# Patient Record
Sex: FEMALE | Race: WHITE
[De-identification: names, ages, dates, MRNs, and addresses within clinical notes are randomized per-mention and may not be internally consistent; named-entity substitution may affect disease eponyms.]

---

## 2017-01-13 DIAGNOSIS — R80.9 TYPE 2 DIABETES MELLITUS WITH MICROALBUMINURIA, WITHOUT LONG-TERM CURRENT USE OF INSULIN (H): Primary | ICD-10-CM

## 2017-01-13 DIAGNOSIS — E11.29 TYPE 2 DIABETES MELLITUS WITH MICROALBUMINURIA, WITHOUT LONG-TERM CURRENT USE OF INSULIN (H): Primary | ICD-10-CM

## 2017-01-13 NOTE — TELEPHONE ENCOUNTER
metFORMIN modified (GLUMETZA) 1000 MG 24 hr tablet         Last Written Prescription Date: 10/26/16  Last Fill Quantity: 180, # refills: 0  Last Office Visit with G, P or [facility] prescribing provider:  3/15/16        BP Readings from Last 3 Encounters:   04/05/16 135/87   03/15/16 114/74   10/01/15 119/74     MICROL       51   4/15/2016  No results found for this basename: microalbumin  CREATININE   Date Value Ref Range Status   04/15/2016 0.65 0.52 - 1.04 mg/dL Final   ]  GFR ESTIMATE   Date Value Ref Range Status   04/15/2016 >90  Non  GFR Calc   >60 mL/min/1.7m2 Final   03/01/2016 80 >60 mL/min/1.7m2 Final     Comment:     Non  GFR Calc   09/25/2015 69 >60 mL/min/1.7m2 Final     Comment:     Non  GFR Calc     GFR ESTIMATE IF BLACK   Date Value Ref Range Status   04/15/2016 >90   GFR Calc   >60 mL/min/1.7m2 Final   03/01/2016 >90   GFR Calc   >60 mL/min/1.7m2 Final   09/25/2015 83 >60 mL/min/1.7m2 Final     Comment:      GFR Calc     CHOL      167   4/15/2016  HDL       47   4/15/2016  LDL       88   4/15/2016  TRIG      158   4/15/2016  CHOLHDLRATIO      3.9   9/25/2015  AST        8   4/15/2016  ALT       16   4/15/2016  A1C      6.9   4/15/2016  A1C      6.8   3/1/2016  A1C      6.5   9/25/2015  A1C      7.0   12/16/2014  A1C      6.7   8/18/2014  POTASSIUM   Date Value Ref Range Status   04/15/2016 4.7 3.4 - 5.3 mmol/L Final

## 2017-01-16 RX ORDER — METFORMIN HYDROCHLORIDE 1000 MG/1
2000 TABLET, FILM COATED, EXTENDED RELEASE ORAL
Qty: 180 TABLET | Refills: 0 | OUTPATIENT
Start: 2017-01-16

## 2017-01-16 NOTE — TELEPHONE ENCOUNTER
See previous requests, she was establishing care with a new provider near her home in December.     Rx refused, called pharmacy and informed them. They will send to her new provider.     Anisha Paul RN   January 16, 2017 10:14 AM  [facility] Triage   [phone]

## 2017-05-15 DIAGNOSIS — E78.5 HYPERLIPIDEMIA LDL GOAL <100: ICD-10-CM

## 2017-05-15 DIAGNOSIS — E11.29 TYPE 2 DIABETES MELLITUS WITH MICROALBUMINURIA, WITHOUT LONG-TERM CURRENT USE OF INSULIN (H): Primary | ICD-10-CM

## 2017-05-15 DIAGNOSIS — R80.9 TYPE 2 DIABETES MELLITUS WITH MICROALBUMINURIA, WITHOUT LONG-TERM CURRENT USE OF INSULIN (H): Primary | ICD-10-CM

## 2017-05-15 RX ORDER — LISINOPRIL 5 MG/1
5 TABLET ORAL DAILY
Qty: 90 TABLET | Refills: 1 | Status: SHIPPED | OUTPATIENT
Start: 2017-05-15 | End: 2017-10-29

## 2017-05-15 RX ORDER — SIMVASTATIN 40 MG
40 TABLET ORAL DAILY
Qty: 90 TABLET | Refills: 1 | Status: SHIPPED | OUTPATIENT
Start: 2017-05-15 | End: 2018-01-27

## 2017-05-15 NOTE — TELEPHONE ENCOUNTER
Routing refill request to provider for review/approval because:  Labs not current  Ronda Mchugh RN [facility] Triage.

## 2017-05-15 NOTE — TELEPHONE ENCOUNTER
simvastatin (ZOCOR) 40 MG tablet     Last Written Prescription Date: 10-25-16  Last Fill Quantity: 90, # refills: 1  Last Office Visit with [facility], [facility] or [facility] prescribing provider: 3-15-16       Lab Results   Component Value Date    CHOL 167 04/15/2016     Lab Results   Component Value Date    HDL 47 04/15/2016     Lab Results   Component Value Date    LDL 88 04/15/2016     Lab Results   Component Value Date    TRIG 158 04/15/2016     Lab Results   Component Value Date    CHOLHDLRATIO 3.9 09/25/2015     lisinopril (PRINIVIL,ZESTRIL) 5 MG tablet      Last Written Prescription Date: 10-25-16  Last Fill Quantity: 90, # refills: 1  Last Office Visit with [facility], [facility] or [facility] prescribing provider: 3-15-16       Potassium   Date Value Ref Range Status   04/15/2016 4.7 3.4 - 5.3 mmol/L Final     Creatinine   Date Value Ref Range Status   04/15/2016 0.65 0.52 - 1.04 mg/dL Final     BP Readings from Last 3 Encounters:   04/05/16 135/87   03/15/16 114/74   10/01/15 119/74

## 2017-08-12 ENCOUNTER — HEALTH MAINTENANCE LETTER (OUTPATIENT)
Age: 62
End: 2017-08-12

## 2017-10-29 DIAGNOSIS — Z11.59 NEED FOR HEPATITIS C SCREENING TEST: ICD-10-CM

## 2017-10-29 DIAGNOSIS — R80.9 TYPE 2 DIABETES MELLITUS WITH MICROALBUMINURIA, WITHOUT LONG-TERM CURRENT USE OF INSULIN (H): ICD-10-CM

## 2017-10-29 DIAGNOSIS — E11.29 TYPE 2 DIABETES MELLITUS WITH MICROALBUMINURIA, WITHOUT LONG-TERM CURRENT USE OF INSULIN (H): ICD-10-CM

## 2017-10-29 DIAGNOSIS — E78.5 HYPERLIPIDEMIA WITH TARGET LDL LESS THAN 100: Primary | ICD-10-CM

## 2017-10-31 RX ORDER — LISINOPRIL 5 MG/1
TABLET ORAL
Qty: 30 TABLET | Refills: 0 | Status: SHIPPED | OUTPATIENT
Start: 2017-10-31

## 2017-10-31 NOTE — TELEPHONE ENCOUNTER
Script faxed to [facility]. TC contacted patient and communicated message below. She states that she moved to [place] a year and a half ago and she has a new PCP. She will contact her pharmacy to make sure they send future requests to new PCP.

## 2017-10-31 NOTE — TELEPHONE ENCOUNTER
Requested Prescriptions   Pending Prescriptions Disp Refills     lisinopril (PRINIVIL/ZESTRIL) 5 MG tablet [Pharmacy Med Name: LISINOPRIL 5MG TAB] 90 tablet      Sig: TAKE 1 TABLET BY MOUTH DAILY    ACE Inhibitors (Including Combos) Protocol Failed    10/29/2017  6:16 AM       Failed - Blood pressure under 140/90    BP Readings from Last 3 Encounters:   04/05/16 135/87   03/15/16 114/74   10/01/15 119/74                Failed - Recent or future visit with authorizing provider's specialty    Patient had office visit in the last year or has a visit in the next 30 days with authorizing provider.  See chart review.              Failed - Normal serum creatinine on file in past 12 months    Recent Labs   Lab Test  04/15/16   0802   CR  0.65            Failed - Normal serum potassium on file in past 12 months    Recent Labs   Lab Test  04/15/16   0802   POTASSIUM  4.7            Passed - Patient is age 18 or older       Passed - No active pregnancy on record       Passed - No positive pregnancy test in past 12 months        Routing refill request to provider for review/approval because:  Labs not current  Patient needs to be seen because it has been more than 1 year since last office visit.  Last OV on 3/15/16        Ronda Mchugh RN [facility] Triage.

## 2017-12-12 DIAGNOSIS — R80.9 TYPE 2 DIABETES MELLITUS WITH MICROALBUMINURIA, WITHOUT LONG-TERM CURRENT USE OF INSULIN (H): ICD-10-CM

## 2017-12-12 DIAGNOSIS — E11.29 TYPE 2 DIABETES MELLITUS WITH MICROALBUMINURIA, WITHOUT LONG-TERM CURRENT USE OF INSULIN (H): ICD-10-CM

## 2017-12-12 RX ORDER — LISINOPRIL 5 MG/1
TABLET ORAL
Qty: 30 TABLET | Refills: 0 | Status: SHIPPED | OUTPATIENT
Start: 2017-12-12 | End: 2018-01-15

## 2017-12-12 NOTE — TELEPHONE ENCOUNTER
Medication is being filled for 1 time refill only due to:  Patient needs labs Potassium, Creatinine. Patient needs to be seen because it has been more than one year since last visit.     Routed to team to assist with setting up needed visit - could be a physical.    Olya Kamara RN  [facility]

## 2017-12-12 NOTE — TELEPHONE ENCOUNTER
Requested Prescriptions   Pending Prescriptions Disp Refills     lisinopril (PRINIVIL/ZESTRIL) 5 MG tablet [Pharmacy Med Name: LISINOPRIL 5MG TAB] 30 tablet      Sig: TAKE 1 TABLET BY MOUTH DAILY    ACE Inhibitors (Including Combos) Protocol Failed    12/12/2017  1:46 PM       Failed - Blood pressure under 140/90    BP Readings from Last 3 Encounters:   04/05/16 135/87   03/15/16 114/74   10/01/15 119/74                Failed - Recent or future visit with authorizing provider's specialty    Patient had office visit in the last year or has a visit in the next 30 days with authorizing provider.  See chart review.              Failed - Normal serum creatinine on file in past 12 months    Recent Labs   Lab Test  04/15/16   0802   CR  0.65            Failed - Normal serum potassium on file in past 12 months    Recent Labs   Lab Test  04/15/16   0802   POTASSIUM  4.7            Passed - Patient is age 18 or older       Passed - No active pregnancy on record       Passed - No positive pregnancy test in past 12 months

## 2017-12-13 NOTE — TELEPHONE ENCOUNTER
See refill request from 10/29/17. Patient has a new PCP and stated that she would advise her pharmacy to send future refill requests to her new PCP.

## 2018-01-15 DIAGNOSIS — R80.9 TYPE 2 DIABETES MELLITUS WITH MICROALBUMINURIA, WITHOUT LONG-TERM CURRENT USE OF INSULIN (H): ICD-10-CM

## 2018-01-15 DIAGNOSIS — E11.29 TYPE 2 DIABETES MELLITUS WITH MICROALBUMINURIA, WITHOUT LONG-TERM CURRENT USE OF INSULIN (H): ICD-10-CM

## 2018-01-15 RX ORDER — LISINOPRIL 5 MG/1
TABLET ORAL
Qty: 30 TABLET | Refills: 0 | Status: SHIPPED | OUTPATIENT
Start: 2018-01-15 | End: 2018-02-03

## 2018-01-27 DIAGNOSIS — E78.5 HYPERLIPIDEMIA LDL GOAL <100: ICD-10-CM

## 2018-01-29 RX ORDER — SIMVASTATIN 40 MG
40 TABLET ORAL DAILY
Qty: 90 TABLET | Refills: 0 | Status: SHIPPED | OUTPATIENT
Start: 2018-01-29

## 2018-01-29 NOTE — TELEPHONE ENCOUNTER
"Requested Prescriptions   Pending Prescriptions Disp Refills     simvastatin (ZOCOR) 40 MG tablet [Pharmacy Med Name: SIMVASTATIN 40MG TAB] 90 tablet     Last Written Prescription Date:  5/15/17  Last Fill Quantity: 90,  # refills: 1   Last Office Visit with [facility] provider:  3/15/16   Future Office Visit:      Sig: TAKE 1 TABLET BY MOUTH DAILY    Statins Protocol Failed    1/27/2018  1:02 AM       Failed - LDL on file in past 12 months    Recent Labs   Lab Test  04/15/16   0802   LDL  88            Failed - Recent or future visit with authorizing provider    Patient had office visit in the last year or has a visit in the next 30 days with authorizing provider.  See \"Patient Info\" tab in inbasket, or \"Choose Columns\" in Meds & Orders section of the refill encounter.            Passed - No abnormal creatine kinase in past 12 months    No lab results found.         Passed - Patient is age 18 or older       Passed - No active pregnancy on record       Passed - No positive pregnancy test in past 12 months          "

## 2018-02-03 ENCOUNTER — TELEPHONE (OUTPATIENT)
Dept: FAMILY MEDICINE | Facility: CLINIC | Age: 63
End: 2018-02-03

## 2018-02-03 DIAGNOSIS — R80.9 TYPE 2 DIABETES MELLITUS WITH MICROALBUMINURIA, WITHOUT LONG-TERM CURRENT USE OF INSULIN (H): ICD-10-CM

## 2018-02-03 DIAGNOSIS — E11.29 TYPE 2 DIABETES MELLITUS WITH MICROALBUMINURIA, WITHOUT LONG-TERM CURRENT USE OF INSULIN (H): ICD-10-CM

## 2018-02-05 RX ORDER — LISINOPRIL 5 MG/1
TABLET ORAL
Qty: 20 TABLET | Refills: 0 | Status: SHIPPED | OUTPATIENT
Start: 2018-02-05

## 2018-02-05 NOTE — TELEPHONE ENCOUNTER
TC contacted patient and was notified that she moved to [place] and has communicated with pharmacy that all refill requests should be going to her new provider Dr Silva. TC contacted [facility] in [place] and they entered a note in patient's file to send all refill requests to Dr Silva.

## 2018-02-05 NOTE — TELEPHONE ENCOUNTER
"Requested Prescriptions   Pending Prescriptions Disp Refills     lisinopril (PRINIVIL/ZESTRIL) 5 MG tablet [Pharmacy Med Name: LISINOPRIL 5MG TAB] 30 tablet     Last Written Prescription Date:  10/31/17  Last Fill Quantity: 30,  # refills: 0   Last Office Visit with [facility] provider:  3/15/16   Future Office Visit:          Sig: TAKE 1 TABLET BY MOUTH DAILY    ACE Inhibitors (Including Combos) Protocol Failed    2/3/2018  1:06 AM       Failed - Blood pressure under 140/90    BP Readings from Last 3 Encounters:   04/05/16 135/87   03/15/16 114/74   10/01/15 119/74                Failed - Recent or future visit with authorizing provider's specialty    Patient had office visit in the last year or has a visit in the next 30 days with authorizing provider.  See \"Patient Info\" tab in inbasket, or \"Choose Columns\" in Meds & Orders section of the refill encounter.            Failed - Normal serum creatinine on file in past 12 months    Recent Labs   Lab Test  04/15/16   0802   CR  0.65            Failed - Normal serum potassium on file in past 12 months    Recent Labs   Lab Test  04/15/16   0802   POTASSIUM  4.7            Passed - Patient is age 18 or older       Passed - No active pregnancy on record       Passed - No positive pregnancy test in past 12 months          "

## 2018-02-05 NOTE — TELEPHONE ENCOUNTER
Routing refill request to provider for review/approval because:  Labs not current:  Per below  Patient needs to be seen because it has been more than 1 year since last office visit.    Route to PCP    Cristin Lane RN

## 2018-02-07 ENCOUNTER — TELEPHONE (OUTPATIENT)
Dept: FAMILY MEDICINE | Facility: CLINIC | Age: 63
End: 2018-02-07

## 2018-02-07 NOTE — TELEPHONE ENCOUNTER
Spoke with pt, states she has moved to [place] and transferred care.  Lily Mireles,    Pap Tracking

## 2018-02-07 NOTE — TELEPHONE ENCOUNTER
Pt is past due for f/u pap smear.  Reminder letter was sent 05/30/17.  LMTC and schedule at [facility].  Left this writer's number in case of questions ([phone]).  If no reply and/or appt within 2 weeks (02/21/18) pt will be considered lost to pap tracking f/u.  Lily Mireles,    Pap Tracking

## 2018-02-23 DIAGNOSIS — E11.29 TYPE 2 DIABETES MELLITUS WITH MICROALBUMINURIA, WITHOUT LONG-TERM CURRENT USE OF INSULIN (H): ICD-10-CM

## 2018-02-23 DIAGNOSIS — R80.9 TYPE 2 DIABETES MELLITUS WITH MICROALBUMINURIA, WITHOUT LONG-TERM CURRENT USE OF INSULIN (H): ICD-10-CM

## 2018-02-23 RX ORDER — LISINOPRIL 5 MG/1
TABLET ORAL
Qty: 20 TABLET | Refills: 0 | OUTPATIENT
Start: 2018-02-23

## 2018-02-23 NOTE — TELEPHONE ENCOUNTER
"Requested Prescriptions   Pending Prescriptions Disp Refills     lisinopril (PRINIVIL/ZESTRIL) 5 MG tablet [Pharmacy Med Name: LISINOPRIL 5MG TAB] 20 tablet     Last Written Prescription Date:  2/5/18  Last Fill Quantity: 20,  # refills: 0   Last office visit: 5/7/2013 with prescribing provider:     Future Office Visit:     Sig: TAKE 1 TABLET BY MOUTH DAILY    ACE Inhibitors (Including Combos) Protocol Failed    2/23/2018  1:07 AM       Failed - Recent or future visit with authorizing provider's specialty    Patient had office visit in the last year or has a visit in the next 30 days with authorizing provider.  See \"Patient Info\" tab in inbasket, or \"Choose Columns\" in Meds & Orders section of the refill encounter.            Failed - Normal serum creatinine on file in past 12 months    Recent Labs   Lab Test  04/15/16   0802   CR  0.65            Failed - Normal serum potassium on file in past 12 months    Recent Labs   Lab Test  04/15/16   0802   POTASSIUM  4.7            Passed - Blood pressure under 140/90 in past 12 months    BP Readings from Last 3 Encounters:   04/05/16 135/87   03/15/16 114/74   10/01/15 119/74                Passed - Patient is age 18 or older       Passed - No active pregnancy on record       Passed - No positive pregnancy test in past 12 months          "

## 2018-05-04 DIAGNOSIS — E78.5 HYPERLIPIDEMIA LDL GOAL <100: ICD-10-CM

## 2018-05-04 RX ORDER — SIMVASTATIN 40 MG
TABLET ORAL
Qty: 90 TABLET | OUTPATIENT
Start: 2018-05-04

## 2018-05-04 NOTE — TELEPHONE ENCOUNTER
"Requested Prescriptions   Pending Prescriptions Disp Refills     simvastatin (ZOCOR) 40 MG tablet [Pharmacy Med Name: SIMVASTATIN 40MG TAB] 90 tablet     Last Written Prescription Date:  1/29/18  Last Fill Quantity: 90,  # refills: 0   Last office visit: 5/7/2013 with prescribing provider:     Future Office Visit:     Sig: TAKE 1 TABLET BY MOUTH DAILY * MUST SEE DOCTOR BEFORE NEXT REFILL &LABS    Statins Protocol Failed    5/4/2018  1:14 AM       Failed - LDL on file in past 12 months    Recent Labs   Lab Test  04/15/16   0802   LDL  88            Failed - Recent (12 mo) or future (30 days) visit within the authorizing provider's specialty    Patient had office visit in the last 12 months or has a visit in the next 30 days with authorizing provider or within the authorizing provider's specialty.  See \"Patient Info\" tab in inbasket, or \"Choose Columns\" in Meds & Orders section of the refill encounter.           Passed - No abnormal creatine kinase in past 12 months    Recent Labs   Lab Test  09/25/15   0728   CKT  34               Passed - Patient is age 18 or older       Passed - No active pregnancy on record       Passed - No positive pregnancy test in past 12 months          "

## 2018-05-04 NOTE — TELEPHONE ENCOUNTER
See 2/3/18 tele encounter.  Rx denied.    Olya Kamara RN  [facility]

## 2018-08-19 ENCOUNTER — HEALTH MAINTENANCE LETTER (OUTPATIENT)
Age: 63
End: 2018-08-19

## 2019-05-03 DIAGNOSIS — E11.21 TYPE 2 DIABETES MELLITUS WITH DIABETIC NEPHROPATHY (H): ICD-10-CM

## 2019-05-03 NOTE — TELEPHONE ENCOUNTER
Patient moved to [place] and no longer under provider care per chart notes.  Anyi Dunham RN

## 2019-05-03 NOTE — TELEPHONE ENCOUNTER
"Requested Prescriptions   Pending Prescriptions Disp Refills     blood glucose monitoring (ACCU-CHEK MULTICLIX) lancets [Pharmacy Med Name: ACCU-CHEK MULTICLIX LANCET] 102 each      Sig: TESTING BLOOD SUGAR TWICE A DAY   Last Written Prescription Date:  10-1-15  Last Fill Quantity: 1,  # refills: prn   Last office visit: 5/7/2013 with prescribing provider:  3-15-16   Future Office Visit:        Diabetic Supplies Protocol Failed - 5/3/2019 11:06 AM        Failed - Recent (6 mo) or future (30 days) visit within the authorizing provider's specialty     Patient had office visit in the last 6 months or has a visit in the next 30 days with authorizing provider.  See \"Patient Info\" tab in inbasket, or \"Choose Columns\" in Meds & Orders section of the refill encounter.            Passed - Medication is active on med list        Passed - Patient is 18 years of age or older          "

## 2019-12-24 ENCOUNTER — DOCUMENTATION ONLY (OUTPATIENT)
Dept: OPHTHALMOLOGY | Facility: CLINIC | Age: 64
End: 2019-12-24

## 2020-02-16 ENCOUNTER — HEALTH MAINTENANCE LETTER (OUTPATIENT)
Age: 65
End: 2020-02-16

## 2020-11-22 ENCOUNTER — HEALTH MAINTENANCE LETTER (OUTPATIENT)
Age: 65
End: 2020-11-22

## 2021-04-10 ENCOUNTER — HEALTH MAINTENANCE LETTER (OUTPATIENT)
Age: 66
End: 2021-04-10

## 2021-05-30 ENCOUNTER — HEALTH MAINTENANCE LETTER (OUTPATIENT)
Age: 66
End: 2021-05-30

## 2021-09-19 ENCOUNTER — HEALTH MAINTENANCE LETTER (OUTPATIENT)
Age: 66
End: 2021-09-19

## 2022-01-09 ENCOUNTER — HEALTH MAINTENANCE LETTER (OUTPATIENT)
Age: 67
End: 2022-01-09

## 2022-03-06 ENCOUNTER — HEALTH MAINTENANCE LETTER (OUTPATIENT)
Age: 67
End: 2022-03-06

## 2022-05-01 ENCOUNTER — HEALTH MAINTENANCE LETTER (OUTPATIENT)
Age: 67
End: 2022-05-01

## 2022-08-21 ENCOUNTER — HEALTH MAINTENANCE LETTER (OUTPATIENT)
Age: 67
End: 2022-08-21

## 2022-11-21 ENCOUNTER — HEALTH MAINTENANCE LETTER (OUTPATIENT)
Age: 67
End: 2022-11-21

## 2023-04-16 ENCOUNTER — HEALTH MAINTENANCE LETTER (OUTPATIENT)
Age: 68
End: 2023-04-16

## 2023-06-02 ENCOUNTER — HEALTH MAINTENANCE LETTER (OUTPATIENT)
Age: 68
End: 2023-06-02

## 2023-11-25 ENCOUNTER — HEALTH MAINTENANCE LETTER (OUTPATIENT)
Age: 68
End: 2023-11-25